# Patient Record
Sex: MALE | Race: WHITE
[De-identification: names, ages, dates, MRNs, and addresses within clinical notes are randomized per-mention and may not be internally consistent; named-entity substitution may affect disease eponyms.]

---

## 2022-03-27 ENCOUNTER — HOSPITAL ENCOUNTER (EMERGENCY)
Dept: HOSPITAL 15 - ER | Age: 37
Discharge: LEFT BEFORE DISCHARGE | End: 2022-03-27
Payer: COMMERCIAL

## 2022-03-27 VITALS — WEIGHT: 250 LBS | BODY MASS INDEX: 33.13 KG/M2 | HEIGHT: 73 IN

## 2022-03-27 VITALS — SYSTOLIC BLOOD PRESSURE: 140 MMHG | DIASTOLIC BLOOD PRESSURE: 82 MMHG

## 2022-03-27 DIAGNOSIS — M79.601: Primary | ICD-10-CM

## 2022-03-27 DIAGNOSIS — Z53.21: ICD-10-CM

## 2022-04-11 ENCOUNTER — HOSPITAL ENCOUNTER (EMERGENCY)
Dept: HOSPITAL 15 - ER | Age: 37
Discharge: LEFT BEFORE DISCHARGE | End: 2022-04-11
Payer: COMMERCIAL

## 2022-04-11 VITALS
WEIGHT: 215 LBS | HEIGHT: 72 IN | DIASTOLIC BLOOD PRESSURE: 88 MMHG | BODY MASS INDEX: 29.12 KG/M2 | SYSTOLIC BLOOD PRESSURE: 130 MMHG

## 2022-04-11 DIAGNOSIS — Z53.29: ICD-10-CM

## 2022-04-11 DIAGNOSIS — R07.89: Primary | ICD-10-CM

## 2022-04-11 LAB
ALBUMIN SERPL-MCNC: 4.6 G/DL (ref 3.4–5)
ALP SERPL-CCNC: 47 U/L (ref 45–117)
ALT SERPL-CCNC: 43 U/L (ref 16–61)
ANION GAP SERPL CALCULATED.3IONS-SCNC: 3 MMOL/L (ref 5–15)
BILIRUB SERPL-MCNC: 0.5 MG/DL (ref 0.2–1)
BUN SERPL-MCNC: 10 MG/DL (ref 7–18)
BUN/CREAT SERPL: 8.6
CALCIUM SERPL-MCNC: 9.8 MG/DL (ref 8.5–10.1)
CHLORIDE SERPL-SCNC: 107 MMOL/L (ref 98–107)
CO2 SERPL-SCNC: 30 MMOL/L (ref 21–32)
GLUCOSE SERPL-MCNC: 98 MG/DL (ref 74–106)
HCT VFR BLD AUTO: 48.3 % (ref 41–53)
HGB BLD-MCNC: 16.9 G/DL (ref 13.5–17.5)
MCH RBC QN AUTO: 31.7 PG (ref 28–32)
MCV RBC AUTO: 90.5 FL (ref 80–100)
NRBC BLD QL AUTO: 0.2 %
POTASSIUM SERPL-SCNC: 4.1 MMOL/L (ref 3.5–5.1)
PROT SERPL-MCNC: 8 G/DL (ref 6.4–8.2)
SODIUM SERPL-SCNC: 140 MMOL/L (ref 136–145)

## 2022-04-11 PROCEDURE — 36415 COLL VENOUS BLD VENIPUNCTURE: CPT

## 2022-04-11 PROCEDURE — 85025 COMPLETE CBC W/AUTO DIFF WBC: CPT

## 2022-04-11 PROCEDURE — 83880 ASSAY OF NATRIURETIC PEPTIDE: CPT

## 2022-04-11 PROCEDURE — 71045 X-RAY EXAM CHEST 1 VIEW: CPT

## 2022-04-11 PROCEDURE — 80053 COMPREHEN METABOLIC PANEL: CPT

## 2022-04-11 PROCEDURE — 81001 URINALYSIS AUTO W/SCOPE: CPT

## 2022-04-11 PROCEDURE — 93005 ELECTROCARDIOGRAM TRACING: CPT

## 2022-04-11 PROCEDURE — 84484 ASSAY OF TROPONIN QUANT: CPT

## 2022-08-31 ENCOUNTER — HOSPITAL ENCOUNTER (EMERGENCY)
Dept: HOSPITAL 15 - ER | Age: 37
Discharge: LEFT BEFORE DISCHARGE | End: 2022-08-31
Payer: COMMERCIAL

## 2022-08-31 VITALS — HEIGHT: 73 IN | BODY MASS INDEX: 31.26 KG/M2 | WEIGHT: 235.89 LBS

## 2022-08-31 VITALS — DIASTOLIC BLOOD PRESSURE: 76 MMHG | SYSTOLIC BLOOD PRESSURE: 127 MMHG

## 2022-08-31 DIAGNOSIS — Z53.21: ICD-10-CM

## 2022-08-31 DIAGNOSIS — R07.89: Primary | ICD-10-CM

## 2022-08-31 DIAGNOSIS — R06.02: ICD-10-CM

## 2022-08-31 PROCEDURE — 93005 ELECTROCARDIOGRAM TRACING: CPT

## 2022-09-24 ENCOUNTER — HOSPITAL ENCOUNTER (EMERGENCY)
Dept: HOSPITAL 15 - ER | Age: 37
Discharge: HOME | End: 2022-09-24
Payer: COMMERCIAL

## 2022-09-24 VITALS — WEIGHT: 240.52 LBS | HEIGHT: 73 IN | BODY MASS INDEX: 31.88 KG/M2

## 2022-09-24 VITALS — DIASTOLIC BLOOD PRESSURE: 84 MMHG | SYSTOLIC BLOOD PRESSURE: 123 MMHG

## 2022-09-24 DIAGNOSIS — F17.210: ICD-10-CM

## 2022-09-24 DIAGNOSIS — K21.9: Primary | ICD-10-CM

## 2022-09-24 LAB
ALBUMIN SERPL-MCNC: 4.3 G/DL (ref 3.4–5)
ALP SERPL-CCNC: 43 U/L (ref 45–117)
ALT SERPL-CCNC: 40 U/L (ref 16–61)
ANION GAP SERPL CALCULATED.3IONS-SCNC: 6 MMOL/L (ref 5–15)
BILIRUB SERPL-MCNC: 0.6 MG/DL (ref 0.2–1)
BUN SERPL-MCNC: 14 MG/DL (ref 7–18)
BUN/CREAT SERPL: 12.6
CALCIUM SERPL-MCNC: 9.2 MG/DL (ref 8.5–10.1)
CHLORIDE SERPL-SCNC: 106 MMOL/L (ref 98–107)
CO2 SERPL-SCNC: 28 MMOL/L (ref 21–32)
GLUCOSE SERPL-MCNC: 97 MG/DL (ref 74–106)
HCT VFR BLD AUTO: 47.1 % (ref 41–53)
HGB BLD-MCNC: 15.9 G/DL (ref 13.5–17.5)
MCH RBC QN AUTO: 31 PG (ref 28–32)
MCV RBC AUTO: 91.4 FL (ref 80–100)
NRBC BLD QL AUTO: 0.1 %
POTASSIUM SERPL-SCNC: 4 MMOL/L (ref 3.5–5.1)
PROT SERPL-MCNC: 7.3 G/DL (ref 6.4–8.2)
SODIUM SERPL-SCNC: 140 MMOL/L (ref 136–145)

## 2022-09-24 PROCEDURE — 71046 X-RAY EXAM CHEST 2 VIEWS: CPT

## 2022-09-24 PROCEDURE — 80053 COMPREHEN METABOLIC PANEL: CPT

## 2022-09-24 PROCEDURE — 99285 EMERGENCY DEPT VISIT HI MDM: CPT

## 2022-09-24 PROCEDURE — 84484 ASSAY OF TROPONIN QUANT: CPT

## 2022-09-24 PROCEDURE — 93005 ELECTROCARDIOGRAM TRACING: CPT

## 2022-09-24 PROCEDURE — 36415 COLL VENOUS BLD VENIPUNCTURE: CPT

## 2022-09-24 PROCEDURE — 85025 COMPLETE CBC W/AUTO DIFF WBC: CPT

## 2022-09-28 ENCOUNTER — HOSPITAL ENCOUNTER (EMERGENCY)
Dept: HOSPITAL 15 - ER | Age: 37
LOS: 1 days | Discharge: LEFT BEFORE DISCHARGE | End: 2022-09-29
Payer: MEDICAID

## 2022-09-28 VITALS — SYSTOLIC BLOOD PRESSURE: 131 MMHG | DIASTOLIC BLOOD PRESSURE: 85 MMHG

## 2022-09-28 VITALS — HEIGHT: 73 IN | WEIGHT: 231.49 LBS | BODY MASS INDEX: 30.68 KG/M2

## 2022-09-28 DIAGNOSIS — F41.9: ICD-10-CM

## 2022-09-28 DIAGNOSIS — R07.89: Primary | ICD-10-CM

## 2022-09-28 DIAGNOSIS — Z53.29: ICD-10-CM

## 2022-09-28 LAB
ALBUMIN SERPL-MCNC: 4.5 G/DL (ref 3.4–5)
ALP SERPL-CCNC: 44 U/L (ref 45–117)
ALT SERPL-CCNC: 38 U/L (ref 16–61)
ANION GAP SERPL CALCULATED.3IONS-SCNC: 7 MMOL/L (ref 5–15)
BILIRUB SERPL-MCNC: 0.7 MG/DL (ref 0.2–1)
BUN SERPL-MCNC: 16 MG/DL (ref 7–18)
BUN/CREAT SERPL: 13.3
CALCIUM SERPL-MCNC: 9.5 MG/DL (ref 8.5–10.1)
CHLORIDE SERPL-SCNC: 105 MMOL/L (ref 98–107)
CO2 SERPL-SCNC: 29 MMOL/L (ref 21–32)
GLUCOSE SERPL-MCNC: 92 MG/DL (ref 74–106)
HCT VFR BLD AUTO: 46 % (ref 41–53)
HGB BLD-MCNC: 15.9 G/DL (ref 13.5–17.5)
MCH RBC QN AUTO: 30.9 PG (ref 28–32)
MCV RBC AUTO: 89.3 FL (ref 80–100)
NRBC BLD QL AUTO: 0.1 %
POTASSIUM SERPL-SCNC: 4.1 MMOL/L (ref 3.5–5.1)
PROT SERPL-MCNC: 7.6 G/DL (ref 6.4–8.2)
SODIUM SERPL-SCNC: 141 MMOL/L (ref 136–145)

## 2022-09-28 PROCEDURE — 36415 COLL VENOUS BLD VENIPUNCTURE: CPT

## 2022-09-28 PROCEDURE — 80053 COMPREHEN METABOLIC PANEL: CPT

## 2022-09-28 PROCEDURE — 84484 ASSAY OF TROPONIN QUANT: CPT

## 2022-09-28 PROCEDURE — 71045 X-RAY EXAM CHEST 1 VIEW: CPT

## 2022-09-28 PROCEDURE — 85025 COMPLETE CBC W/AUTO DIFF WBC: CPT

## 2022-09-28 PROCEDURE — 93005 ELECTROCARDIOGRAM TRACING: CPT

## 2022-10-05 ENCOUNTER — HOSPITAL ENCOUNTER (EMERGENCY)
Dept: HOSPITAL 15 - ER | Age: 37
Discharge: LEFT BEFORE DISCHARGE | End: 2022-10-05
Payer: COMMERCIAL

## 2022-10-05 VITALS — WEIGHT: 229.28 LBS | HEIGHT: 73 IN | BODY MASS INDEX: 30.39 KG/M2

## 2022-10-05 VITALS — SYSTOLIC BLOOD PRESSURE: 132 MMHG | DIASTOLIC BLOOD PRESSURE: 90 MMHG

## 2022-10-05 DIAGNOSIS — R20.0: ICD-10-CM

## 2022-10-05 DIAGNOSIS — Z53.21: ICD-10-CM

## 2022-10-05 DIAGNOSIS — R07.89: Primary | ICD-10-CM

## 2022-10-05 LAB
ALBUMIN SERPL-MCNC: 4.3 G/DL (ref 3.4–5)
ALP SERPL-CCNC: 47 U/L (ref 45–117)
ALT SERPL-CCNC: 41 U/L (ref 16–61)
ANION GAP SERPL CALCULATED.3IONS-SCNC: 7 MMOL/L (ref 5–15)
APTT PPP: 27 SEC (ref 24.6–33.4)
BILIRUB SERPL-MCNC: 0.7 MG/DL (ref 0.2–1)
BUN SERPL-MCNC: 14 MG/DL (ref 7–18)
BUN/CREAT SERPL: 11.3
CALCIUM SERPL-MCNC: 9.1 MG/DL (ref 8.5–10.1)
CHLORIDE SERPL-SCNC: 106 MMOL/L (ref 98–107)
CO2 SERPL-SCNC: 29 MMOL/L (ref 21–32)
GLUCOSE SERPL-MCNC: 103 MG/DL (ref 74–106)
HCT VFR BLD AUTO: 46.5 % (ref 41–53)
HGB BLD-MCNC: 15.8 G/DL (ref 13.5–17.5)
INR PPP: 0.94 (ref 0.9–1.15)
MAGNESIUM SERPL-MCNC: 2.2 MG/DL (ref 1.6–2.6)
MCH RBC QN AUTO: 30.9 PG (ref 28–32)
MCV RBC AUTO: 91 FL (ref 80–100)
NRBC BLD QL AUTO: 0.4 %
POTASSIUM SERPL-SCNC: 3.7 MMOL/L (ref 3.5–5.1)
PROT SERPL-MCNC: 7.4 G/DL (ref 6.4–8.2)
SODIUM SERPL-SCNC: 142 MMOL/L (ref 136–145)

## 2022-10-05 PROCEDURE — 93005 ELECTROCARDIOGRAM TRACING: CPT

## 2022-10-05 PROCEDURE — 84484 ASSAY OF TROPONIN QUANT: CPT

## 2022-10-05 PROCEDURE — 84443 ASSAY THYROID STIM HORMONE: CPT

## 2022-10-05 PROCEDURE — 71045 X-RAY EXAM CHEST 1 VIEW: CPT

## 2022-10-05 PROCEDURE — 85025 COMPLETE CBC W/AUTO DIFF WBC: CPT

## 2022-10-05 PROCEDURE — 85379 FIBRIN DEGRADATION QUANT: CPT

## 2022-10-05 PROCEDURE — 85610 PROTHROMBIN TIME: CPT

## 2022-10-05 PROCEDURE — 85730 THROMBOPLASTIN TIME PARTIAL: CPT

## 2022-10-05 PROCEDURE — 80053 COMPREHEN METABOLIC PANEL: CPT

## 2022-10-05 PROCEDURE — 83880 ASSAY OF NATRIURETIC PEPTIDE: CPT

## 2022-10-05 PROCEDURE — 36415 COLL VENOUS BLD VENIPUNCTURE: CPT

## 2022-10-05 PROCEDURE — 83735 ASSAY OF MAGNESIUM: CPT

## 2025-01-14 NOTE — DVH
EXAM: XY CHEST PORTABLE



CLINICAL HISTORY: CP



TECHNIQUE: Single AP view of the chest



WID: 



COMPARISON: CXRP on DOS: 10/5/22



FINDINGS: 



Lines and tubes: None



Chest: 



The heart size and pulmonary vasculature is within normal limits.



No pleural effusion, pneumothorax, or consolidation.



The osseous structures are grossly intact.



IMPRESSION: 



No acute cardiopulmonary abnormality.



Electronically Signed by: Lizzy Thompson at 01/14/2025 17:58:28 PM

## 2025-01-14 NOTE — ECG
[facility]

                                       

Test Date:    2025               Test Time:    18:28:57

Pat Name:     MAYUR MARSH        Department:   ER

Patient ID:   [id number]           Room:          

Gender:       M                        Technician:   ANNA

:          1985               Requested By: MEREDITH SCHREIBER

Order Number: [id number]        Reading MD:    

                                 Measurements

Intervals                              Axis          

Rate:         67                       P:            25

AL:           146                      QRS:          82

QRSD:         90                       T:            55

QT:           367                                    

QTc:          388                                    

                           Interpretive Statements

Sinus rhythm



Please click the below link to view image of tracing.

## 2025-01-14 NOTE — ED.PDOC
History of Present Illness


HPI Comments


38 y/o M, with a history of anxiety, panic attacks, and former tobacco 

cigarettes and nicotine vape use, presents with c/o of chest pain, shortness of 

breath, dizziness, and fatigue for 1.5x days, today. Patient endorses on sudden 

and unprovoked onset of symptoms, intermittently, since initial onset, with no 

prior history of, that lasts a "few minutes" in duration "every 2 hours." He 

comments on pain being "heavy" in quality that feels like "[his] heart is 

slowing down and pumping 'syrup'." Patient reports history of "irregular heart 

rhythm" that he was diagnosed when following up with a cardiologist after having

 both stress tests and Halter monitor performed in the past but comments on 

current symptoms feeling different. He reports no additional relevant or 

pertinent history, such as recent travel, sick contact, or substance use. 

Patient denies having any palpitations, cough, nausea, vomiting, fever, chills, 

or other associated symptoms or modifiers at this time.


Chief Complaint:  Chest Pain


Time Seen by MD:  16:15


Primary Care Provider:  BETTY


Reviewed Notes:  Nurses Notes, Medications, Allergies


Allergies:  


Coded Allergies:  


     NO KNOWN ALLERGIES (Unverified , 22)


Home Meds


Active Scripts


Omeprazole Magnesium (Omeprazole) 20 Mg Tab, 20 MG PO DAILY for 14 Days, #14 TAB


   Prov:YOVANY GAMBLE MD         22


Information Source:  Patient


Mode of Arrival:  Ambulatory


Severity:  Moderate


Timing:  Days


Duration:  Intermittent


Prehospital treatment:  None


Review of Systems:


REVIEW OF SYSTEMS: Fatigue, no fever, no chills


HEENT: No sore throat, no earache, no congestion,  no neck pain. 


Cardiac: Chest pain. No palpitations. 


Lungs: Shortness of breath, no cough. 


GI: No nausea, no vomiting, no diarrhea, no constipation, no abdominal pain


: No dysuria, frequency, or urgency. No hematuria. 


Musculoskeletal: No joint pain , no joint swelling, no extremity edema. 


Skin: No rash, no itching. 


Neuro:  Dizziness, no headache, no weakness


Vital Signs





                                   Vital Signs








  Date Time  Temp Pulse Resp B/P (MAP) Pulse Ox O2 Delivery O2 Flow Rate FiO2


 


25 19:06  67      


 


25 17:40 99.0  18 113/73 (86) 97   








Physical Exam


General: Awake, alert and oriented. No acute distress. 





Skin: Skin in warm, dry and intact. Appropriate color for ethnicity. Nailbeds 

pink with no cyanosis.





HEENT: The head is normocephalic and atraumatic. Conjunctivae are clear without 

exudates or hemorrhage. Sclera is non-icteric. EOM are intact. No signs of 

nystagmus. Eyelids are normal in appearance without swelling or lesions. Oral 

mucosa is pink and moist





Neck: The neck is supple with normal range of motion. No JVD.





Cardiac:  Heart rate and rhythm are normal. No murmurs, gallops, or rubs are 

auscultated. 





Respiratory: No signs of respiratory distress. Lung sounds are clear in all 

lobes bilaterally without rales, ronchi, or wheezes.





Abdominal: Abdomen is soft, non-tender without distention. Bowel sounds are 

present and normoactive in all four quadrants. 





Extremities: Upper and lower extremities are atraumatic in appearance without 

deformity or edema.





Neurological: The patient is awake, alert and oriented to person, place, and 

time with normal speech. Speech is clear. There is no facial asymmetry. 





Psychiatric: Appropriate mood and affect. Good judgement and insight. No visual 

or auditory hallucinations.





Past Medical History


PAST MEDICAL HISTORY:  Anxiety


Past Medical History (Other):  


panic attacks


Surgical History:  Denies all surgeries





Family History


Family History:  Reviewed,noncontributory to illness





Social History


Smoker:  Non-Smoker (former tobacco cigarettes and nicotine vape use )


Alcohol:  Sober


Drugs:  Denies Drug Use


Lives In:  Home





Was a procedure done?


Was a procedure done?:  No





EKG


EKG :  


   Pulse Rate (adult):  67


   Axis:  Normal


   Cardiac Rhythm:  NSR


   Block:  None


   Hypertrophy:  None


   ST:  Normal





Differential Dx


Considerations may include:


MI, PE, arrhythmia, ACS,  anxiety, panic attacks, angina, costochondritis, 

pericarditis, PNA, viral syndrome, URI





X-Ray, Labs, Meds, VS





                                   Vital Signs








  Date Time  Temp Pulse Resp B/P (MAP) Pulse Ox O2 Delivery O2 Flow Rate FiO2


 


25 19:06  67      


 


25 18:28  67      


 


25 17:40 99.0 81 18 113/73 (86) 97   


 


25 17:36  74      








                                       Lab








Test


 25


18:51 25


17:47 25


17:45 Range/Units


 


 


Troponin I High Sensitivity Pending  4   </=54  ng/L


 


White Blood Count


 


 5.4 


 


 4.4-10.8


10^3/uL


 


Red Blood Count


 


 4.97 


 


 4.5-5.90


10^6/uL


 


Hemoglobin  15.9   13.5-17.5  g/dL


 


Hematocrit  45.6   41.0-53.0  %


 


Mean Corpuscular Volume  91.7   80.0-100.0  fL


 


Mean Corpuscular Hemoglobin  32.0   28.0-32.0  pg


 


Mean Corpuscular Hemoglobin


Concent 


 34.9 


 


 32.0-36.0  g/dL





 


Red Cell Distribution Width  13.4   11.8-14.3  %


 


Platelet Count


 


 187 


 


 140-450


10^3/uL


 


Mean Platelet Volume  7.7   6.9-10.8  fL


 


Neutrophils (%) (Auto)  63.3   37.0-80.0  %


 


Lymphocytes (%) (Auto)  25.4   10.0-50.0  %


 


Monocytes (%) (Auto)  7.4   0.0-12.0  %


 


Eosinophils (%) (Auto)  3.4   0.0-7.0  %


 


Basophils (%) (Auto)  0.5   0.0-2.0  %


 


Neutrophils # (Auto)


 


 3.4 


 


 1.6-8.6  10


^3/uL


 


Lymphocytes # (Auto)


 


 1.4 


 


 0.4-5.4  10


^3/uL


 


Monocytes # (Auto)  0.4   0-1.3  10 ^3/uL


 


Eosinophils # (Auto)  0.2   0-0.8  10 ^3/uL


 


Basophils # (Auto)  0   0-0.2  10 ^3/uL


 


Nucleated Red Blood Cells  0.2    %


 


Sodium Level  140   136-145  mmol/L


 


Potassium Level  3.6   3.5-5.1  mmol/L


 


Chloride Level  105     mmol/L


 


Carbon Dioxide Level  30   20-31  mmol/L


 


Anion Gap  5   5-15  


 


Blood Urea Nitrogen  14   9-23  mg/dL


 


Creatinine


 


 1.13 


 


 0.700-1.30


mg/dL


 


Glomerular Filtration Rate


Calc 


 84 


 


 >90  mL/min





 


BUN/Creatinine Ratio  12.4   10.0-20.0  


 


Serum Glucose  89     mg/dL


 


Calcium Level  10.6 H  8.7-10.4  mg/dL


 


Total Bilirubin  0.7   0.2-1.0  mg/dL


 


Aspartate Amino Transferase


(AST) 


 28 


 


 13-40  U/L





 


Alanine Aminotransferase (ALT)  39   7-40  U/L


 


Alkaline Phosphatase  45 L    U/L


 


Total Protein  7.6   5.7-8.2  g/dL


 


Albumin  5.0 H  3.2-4.8  g/dL


 


Urine Color   Light-yellow  Yellow  


 


Urine Clarity   Clear  Clear  


 


Urine pH   7.0  5.0-9.0  


 


Urine Specific Gravity   1.017  1.001-1.035  


 


Urine Protein   Negative  Negative  


 


Urine Ketones   Negative  Negative  


 


Urine Blood   Negative  Negative  /uL


 


Urine Nitrite   Negative  Negative  


 


Urine Bilirubin   Negative  Negative  


 


Urine Urobilinogen   Normal  Negative  mg/dL


 


Urine Leukocyte Esterase   Negative  Negative  /uL


 


Urine RBC   1  0 - 3  /hpf


 


Urine WBC   None seen  0 - 3  /hpf


 


Urine Squamous Epithelial


Cells 


 


 None seen 


 <5  /hpf





 


Urine Bacteria   None seen  None Seen  /hpf


 


Urine Glucose   Normal  Normal  mg/dL





[place] [id number]


Ph: [phone]





DIAGNOSTIC IMAGING


Diagnostic Imaging Report : [id number]


Signed








PATIENT: MAYUR MARSH   ACCT: [id number]   UNIT: [id number]


: 1985   LOC: ER   ROOM / BED:  / 


AGE / SEX: 39 / M   ADM STATUS: REG ER   SERVICE DT: 25





ORDERING PHYSICIAN: MEREDITH SCHREIBER


PROCEDURE(s): CXRP - CHEST PORTABLE


REASON: CP


ORDER NUMBER(s): [id number], ACCESSION NUMBER(s): [id number]








EXAM: XY CHEST PORTABLE





CLINICAL HISTORY: CP





TECHNIQUE: Single AP view of the chest





WID: 





COMPARISON: CXRP on DOS: 10/5/22





FINDINGS: 





Lines and tubes: None





Chest: 





The heart size and pulmonary vasculature is within normal limits.





No pleural effusion, pneumothorax, or consolidation.





The osseous structures are grossly intact.





IMPRESSION: 





No acute cardiopulmonary abnormality.





Electronically Signed by: Lizzy Henry at 2025 17:58:28 PM











DICTATED BY: OZZIE HENRY MD


DICTATED DATE/TIME: 25





SIGNED BY: OZZIE HENRY MD


SIGNED DATE/TIME: 25





CC:


Time of 1ST Reevaluation:  16:45


Reevaluation 1ST:  Unchanged


Patient Education/Counseling:  Diagnosis, Treatment


Family Education/Counseling:  No Family Present





Departure 1


Departure


Time of Disposition:  19:45


Impression:  


   Primary Impression:  


   Chest pain


Disposition:  01 HOME / SELF CARE / HOMELESS


Condition:  Stable





Additional Instructions:  


ED DISCHARGE INSTRUCTIONS


 


Instructions: 


Please read all instructions provided in this packet carefully. 





Although you have been discharged from the Emergency Department, this does not 

mean that you have a "clean bill of health". No definitive diagnosis for your 

symptoms has been made today. It is possible that you are in the process of 

developing a serious illness. This is why you must return to the ED without fail

 if any new or worsening symptoms (especially if your symptoms include chest 

pain, trouble breathing, abdominal pain, fever, headache, confusion, trouble 

seeing, or trouble walking)





It is also very important that you see a primary care doctor within the next 3-5

 days to follow up.





Schedule an appointment with your cardiologist for further evaluation of your 

symptoms within the next week as well.





If you are unable to get an appointment, return to the ED for re-evaluation.











CHEST PAIN EDUCATION





There are many things that can cause chest pain. Some are not serious and will 

get better on their own in a few days. But some kinds of chest pain need more 

testing and treatment. Your doctor may have recommended a follow-up visit in the

 next few days. If you are not getting better, you may need more tests or 

treatment.





Even though your doctor has released you, you still need to watch for any 

problems. The doctor carefully checked you, but sometimes problems can develop 

later. If you have new symptoms or if your symptoms do not get better, get 

medical care right away.





If you have worse or different chest pain or pressure that lasts more than 5 

minutes or you passed out (lost consciousness), call 911 or seek other emergency

 help right away.





A medical visit is only one step in your treatment. Even if you feel better, you

 still need to do what your doctor recommends, such as going to all suggested 

follow-up appointments and taking medicines exactly as directed. This will help 

you recover and help prevent future problems.





How can you care for yourself at home?


Rest until you feel better.


Take your medicine exactly as prescribed. Call your doctor if you think you are 

having a problem with your medicine.


Do not drive after taking a prescription pain medicine.


When should you call for help?


 


Call 911 if:





You passed out (lost consciousness).


You have severe difficulty breathing.


You have symptoms of a heart attack. These may include:


Chest pain or pressure, or a strange feeling in your chest.


Sweating.


Shortness of breath.


Nausea or vomiting.


Pain, pressure, or a strange feeling in your back, neck, jaw, or upper belly or 

in one or both shoulders or arms.


Lightheadedness or sudden weakness.


A fast or irregular heartbeat.


After you call 911, the  may tell you to chew 1 adult-strength or 2 to 4

 low-dose aspirin. Wait for an ambulance. Do not try to drive yourself.


Call your doctor now or seek immediate medical care if:





You have any trouble breathing.


You have new or different chest pain.


You are dizzy or lightheaded, or you feel like you may faint.


Watch closely for changes in your health, and be sure to contact your doctor if 

you do not get better as expected.





Current as of: 2024





Author: [employer] Staff?


Comments







































































---------------------------------------------------------


Extensive evaluation was performed in attempt to identify or rule out:  (See 

differential diagnosis section)





The following tests were ordered, and results were reviewed by me:  (See diag

nostic results section)





The following test were independently interpreted by me: N/A





I reviewed and agreed with the following test results read by other providers: 

N/A





I reviewed the following notes from the pt's past medical encounters: (None 

available at this time)





Additional information was gathered from interviewing the following independent 

historians: N/A





Discussion of management or test interpretation with external physician/other 

qualified health care professional: N/A





Addressed [ ]one or more chronic illnesses with severe exacerbation, 

progression, or side effects of treatment:  [ ]an acute or chronic illness that 

poses a threat to life or bodily function: [ ]





Decision regarding hospitalization or escalation of hospital level of care: Risk

and benefits of admission for further treatment of patient's condition was 

considered. Due to patient's current clinical  condition, high risk of decline 

and poor outcome if discharged and need for further inpatient management and 

monitoring, patient will be admitted to the hospital. 





Drug therapy requiring intensive monitoring for toxicity: N/A 


Parenteral controlled substances: N/A


Decision regarding elective major surgery with identified patient or procedure 

risk factors: N/A


Decision regarding emergency major surgery: N/A


Decision not to resuscitate or to de-escalate care because of poor prognosis: 

N/A


Diagnosis or treatment significantly limited by social determinants of health: 

N/A





Decision regarding hospitalization or escalation of hospital level of care:  

Risks and benefits of admission for further treatment of patient's condition was

considered however due to patient's stable condition patient will be discharged 

to follow up closely or return to care for worsening of condition or inability 

to follow up.





Critical Care Note


Critical Care Time?:  No





Stability


Stability form required:  No





Heart Score


Heart Score:  








Heart Score Response (Comments) Value


 


History Slightly Suspicious 0


 


EKG Normal 0


 


Age <45 0


 


Risk Factors                            1 or 2 risk factors 1


 


Total  1














I personally scribed for JOSIE COELHO MD ([email or web address]) on 25 at 19:06.  

Electronically submitted by Ihsan Villarreal ([email or web address]).





JOSIE COELHO MD            2025 19:06

## 2025-03-06 NOTE — DVH
EXAM: XY CHEST XRAY 1 VIEW



Indication: cp



Technique: Single frontal view of the chest was obtained



Comparison: XY CHEST PORTABLE on DOS: 1/14/25, CXRP on DOS: 10/5/22, CHEST PORTABLE on DOS: 9/28/22, 
CHEST PORTABLE on DOS: 4/11/22, CXRP on DOS: 4/11/22



FINDINGS: 



Lines and Tubes: None



Lungs: No focal consolidation.



Pleura: No effusion.



No pneumothorax. 



Cardiomediastinal contours: Unremarkable



Bones: No acute osseous abnormality.



IMPRESSION:



No acute cardiopulmonary disease.



Electronically Signed by: Shelbie Soria at 03/06/2025 10:13:16 AM

## 2025-03-06 NOTE — ECG
[facility]

                                       

Test Date:    2025               Test Time:    08:03:27

Pat Name:     MAYUR MARSH        Department:   C

Patient ID:   [id number]           Room:         0284T

Gender:       M                        Technician:   RODNEY

:          1985               Requested By: MARIELA LUNA

Order Number: [id number]        Reading MD:   Oscar Villarreal

                                 Measurements

Intervals                              Axis          

Rate:         86                       P:            42

SC:           160                      QRS:          74

QRSD:         86                       T:            54

QT:           359                                    

QTc:          430                                    

                           Interpretive Statements

Sinus rhythm

Ventricular premature complex

Electronically Signed On 3-8-2025 17:58:19 PST by Oscar Villarreal



Please click the below link to view image of tracing.

## 2025-03-06 NOTE — DVHHP2
History of Present Illness


Reason for Visit:  Chest pain


History of Present Illness


Shai Dickey is a 39-year-old male with past medical history of 

hypertension and anxiety who presents to the ED with chest pain and tightness 

nonradiating that started around 7:00 a.m. while he was at work.  Patient 

reports that he is a  was walking around gathering items together 

when the pain suddenly came about he states that the pain is 3/10 tight and 

intermittent.  He also endorses shortness of breath and bilateral upper 

extremity and lower extremity numbness and tingling.  Patient states that he has

a usual stressors in his life but nothing out of the ordinary or new.  Patient 

denies any recent trauma or injury, recent illnesses, abdominal pain, nausea, 

vomiting, fever, chills, wheezing, lightheadedness, dizziness, and weakness.





Patient also reports that at the age of 29 years old he was in rehab for alcohol

abuse and around 9 years ago he had stopped rehab.  Patient states that if he 

does not have to take medications he will not take anything.  He also states 

that he does not take any home medications.


Cardiovascular:  HTN


Psych:  Anxiety


Past Surgical History:  None


Family History:  Cancer, Other (Dad with colon cancer)


Smoke:  Quit


ALCOHOL:  none (Quit)


Drugs:  None (Quit marijuana)


Lives:  Alone


Domestic Violence:  Neg





Review of Systems


Respiratory:  Shortness of breath


Cardiovascular:  Chest Pain


Musculoskeletal:  other (Numbness and tingling bilateral upper and lower extrem

ities)


Allergies:  


Coded Allergies:  


     NO KNOWN ALLERGIES (Unverified , 4/11/22)





Exam


Vital Signs





Vital Signs








  Date Time  Temp Pulse Resp B/P (MAP) Pulse Ox O2 Delivery O2 Flow Rate FiO2


 


3/6/25 09:32    131/85    


 


3/6/25 09:29 97.9 66 17  97   





 97.9       


 


3/6/25 08:51      Room Air* 0 21








General Appearance:  Alert, Oriented X3, Cooperative, No acute distress


HEENT:  Atraumatic, PERRLA, EOMI, Mucous membr. moist/pink


Respiratory:  Clear to auscultation, Normal air movement


Cardiovascular:  Regular rate, Normal S1, Normal S2, No murmurs


Abdominal:  Normal bowel sounds, Soft, No tenderness, No hepatospenomegaly, No 

masses


Extremities:  No clubbing, No cyanosis, No edema, Normal pulses, No 

tenderness/swelling


Skin:  No significant lesion


Neuro:  Normal speech, Strength at 5/5 X4 ext, Normal tone, Sensation intact


Psych/Mental Status:  Mental status NL, Other (Anxious)





Labs/Xrays





                                      Labs








Test


 3/6/25


09:19 3/6/25


08:19 Range/Units


 


 


Troponin I High Sensitivity 3 L  </=54  ng/L


 


White Blood Count


 


 6.6 


 4.4-10.8


10^3/uL


 


Red Blood Count


 


 5.10 


 4.5-5.90


10^6/uL


 


Hemoglobin  16.3  13.5-17.5  g/dL


 


Hematocrit  46.4  41.0-53.0  %


 


Mean Corpuscular Volume  90.9  80.0-100.0  fL


 


Mean Corpuscular Hemoglobin  32.0  28.0-32.0  pg


 


Mean Corpuscular Hemoglobin


Concent 


 35.2 


 32.0-36.0  g/dL





 


Red Cell Distribution Width  13.3  11.8-14.3  %


 


Platelet Count


 


 182 


 140-450


10^3/uL


 


Mean Platelet Volume  7.7  6.9-10.8  fL


 


Neutrophils (%) (Auto)  78.0  37.0-80.0  %


 


Lymphocytes (%) (Auto)  15.0  10.0-50.0  %


 


Monocytes (%) (Auto)  4.7  0.0-12.0  %


 


Eosinophils (%) (Auto)  1.9  0.0-7.0  %


 


Basophils (%) (Auto)  0.4  0.0-2.0  %


 


Neutrophils # (Auto)


 


 5.2 


 1.6-8.6  10


^3/uL


 


Lymphocytes # (Auto)


 


 1.0 


 0.4-5.4  10


^3/uL


 


Monocytes # (Auto)  0.3  0-1.3  10 ^3/uL


 


Eosinophils # (Auto)  0.1  0-0.8  10 ^3/uL


 


Basophils # (Auto)  0  0-0.2  10 ^3/uL


 


Nucleated Red Blood Cells  0.0   %


 


Sodium Level  141  136-145  mmol/L


 


Potassium Level  4.2  3.5-5.1  mmol/L


 


Chloride Level  108 H   mmol/L


 


Carbon Dioxide Level  25  20-31  mmol/L


 


Anion Gap  8  5-15  


 


Blood Urea Nitrogen  18  9-23  mg/dL


 


Creatinine


 


 1.09 


 0.700-1.30


mg/dL


 


Glomerular Filtration Rate


Calc 


 88 


 >90  mL/min





 


BUN/Creatinine Ratio  16.5  10.0-20.0  


 


Serum Glucose  104    mg/dL


 


Hemoglobin A1c  5.1  <5.7  % A1C


 


Calcium Level  10.5 H 8.7-10.4  mg/dL


 


Triglycerides Level  78  < 150  mg/dL


 


Cholesterol Level  188  < 200  mg/dL


 


LDL Cholesterol  125 H < 100  mg/dL


 


HDL Cholesterol  52  40-59  mg/dL


 


Thyroid Stimulating Hormone


(TSH) 


 0.68 


 0.55-4.78


uIU/mL








EXAM: XY CHEST XRAY 1 VIEW





Indication: cp





Technique: Single frontal view of the chest was obtained





Comparison: XY CHEST PORTABLE on DOS: 1/14/25, CXRP on DOS: 10/5/22, CHEST 

PORTABLE on DOS: 9/28/22, CHEST PORTABLE on DOS: 4/11/22, CXRP on DOS: 4/11/22





FINDINGS: 





Lines and Tubes: None





Lungs: No focal consolidation.





Pleura: No effusion.





No pneumothorax. 





Cardiomediastinal contours: Unremarkable





Bones: No acute osseous abnormality.





IMPRESSION:





No acute cardiopulmonary disease.





Assessment/Plan


Assessment/Plan


Assessment


Chest pain


Bilateral upper extremity and lower extremity numbness and tingling likely due 

to anxiety


History of anxiety


History of hypertension


History of ETOH abuse, was in rehab at 29 years of age


Ex tobacco use 


Ex marijuana use





Plan 


Admit to tele 


Nitro given in ED


Aspirin given in ED 


Patient refused Ativan in ED


UDS 


EKG


Troponin negative 


Chest x-ray noted 


TSH


Lipid panel


A1c


Echo ordered 


ACS workup 


ACS protocol


Antiemetics 


Pain management


Diet 


No home medications reconciled 


Discussed plan of care with patient and nurse


Educated patient on continuing cessation of EtOH, tobacco, and marijuana use


Plan discussed with:  Patient


My Orders





                           Orders - STEPH AGUILAR FNJASMEET








Procedure Category Date Status





  Time 


 


Chest Xray 1 View XY 3/6/25 Resulted





  09:42 


 


Admit ADMIT 3/6/25 Transmitted





  11:23 


 


Code Status CODE 3/6/25 Transmitted





  11:23 


 


Vital Signs KAY 3/6/25 Transmitted





  11:23 


 


Cardiac Monitor KAY 3/6/25 Transmitted





  11:23 


 


Cardiac DIET 3/6/25 Transmitted





Diet-2gna,Lofat,Lochol  Lunch 


 


Aspirin Tablet PHA 3/7/25 Transmitted





  10:00 


 


Lipitor 40mg Hs PHA 3/6/25 Transmitted





Hi-Intensity  22:00 


 


Acetaminophen Tablet PHA 3/6/25 Transmitted





 (Tylenol Tablet)  11:30 


 


Complete Blood Count LAB 3/7/25 Verified





  04:00 


 


Basic Metabolic Panel LAB 3/7/25 Verified





  04:00 


 


Magnesium LAB 3/7/25 Verified





  04:00 


 


Echo 2d Mode Cardiac US 3/6/25 Transmitted





DOP  11:23 


 


Electrocardigram EKG 3/7/25 Transmitted





  04:00 


 


Troponin-I Hs LAB 3/6/25 Transmitted





  11:23 


 


Cardiac KAY 3/6/25 Transmitted





Rehabilitation - Outpa   


 


Nitroglycerin PHA 3/6/25 Transmitted





Sublingual (Ntrostat  11:30 


 


Morphine Sulfate PHA 3/6/25 Transmitted





Injection  11:30 


 


Stat Ekg For Chest [facility] 3/6/25 Transmitted





Pain  11:23 


 


Notify Md Of Changes [facility] 3/6/25 Transmitted





From Base  11:23 


 


Telemetry Monitor For [facility] 3/6/25 Transmitted





24 Hours  11:23 


 


Emergency Dysrhythmia [facility] 3/6/25 Transmitted





Protocol  11:23 


 


Rhythm Strips Once [facility] 3/6/25 Transmitted





Every Shift  11:23 


 


Oxygen By Nasal RT 3/6/25 Transmitted





Cannula  11:23 











Date of Service:  Mar 6, 2025


Billing Provider:  STEPH AGUILAR


Common Visit Codes:  99223-INITIAL  INP/OBS CARE (HIGH)











STEPH AGUILAR               Mar 6, 2025 11:44

## 2025-03-06 NOTE — ED.PDOC
HPI Comments


38 y/o M, with PMHX of anxiety presents to the ED for CC of chest pain. Patient 

states, that he has been experiencing left sided non-radiating chest pain 

xhours. Patient relays, that pain came on suddenly with light exertion at work. 

Patient comments on, experiencing similar symptoms in past with associated 

shortness of breath and chest discomfort. Patient describes pain as tightness. 

Patient denies palpitations, abdominal pain, headache, numbness, or weakness. No

other associated symptoms, modifiers, recent injuries or sick contacts at this 

time.


Chief Complaint:  Chest Pain


Time Seen by MD:  08:20


Primary Care Provider:  BETTY


Reviewed Notes:  Nurses Notes, Medications, Allergies


Allergies:  


Coded Allergies:  


     NO KNOWN ALLERGIES (Unverified , 22)


Home Meds


Active Scripts


Omeprazole Magnesium (Omeprazole) 20 Mg Tab, 20 MG PO DAILY for 14 Days, #14 TAB


   Prov:YOVANY GAMBLE MD         22


Information Source:  Patient


Mode of Arrival:  Ambulatory


Severity:  Mild


Timing:  Hours


Duration:  Since onset


Prehospital treatment:  None


Location:  Chest (L)


Radiation:  No Radiation


Quality:  Tightness


Onset:  With Light Exertion


Cardiac Risk Factors:  None


PE Risk Factors:  None


History of:  Similar pain in past


Modifying Factors:  Nothing


Associated Signs and Symptoms:  SOB





Past Medical History


PAST MEDICAL HISTORY:  Anxiety


Surgical History:  Denies all surgeries





Family History


Family History:  Reviewed,noncontributory to illness





Social History


Smoker:  Non-Smoker


Alcohol:  Sober


Drugs:  Denies Drug Use


Lives In:  Home





Constitutional:  denies: chills, diaphoresis, fatigue, fever, malaise, sweats, 

weakness, others


EENTM:  denies: blurred vision, double vision, ear bleeding, ear discharge, ear 

drainage, ear pain, ear ringing, eye pain, eye redness, hearing loss, mouth 

pain, mouth swelling, nasal discharge, nose bleeding, nose congestion, nose gomez

n, photophobia, tearing, throat pain, throat swelling, voice changes, others


Respiratory:  reports: shortness of breath; denies: cough, hemoptysis, 

orthopnea, SOB at rest, SOB with excertion, stridor, wheezing, others


Cardiovascular:  reports: chest pain; denies: dizzy spells, diaphoresis, Dyspnea

on exertion, edema, irregular heart beat, left arm pain, lightheadedness, 

palpitations, PND, syncope, others


Gastrointestinal:  denies: abdomen distended, abdominal pain, blood streaked 

bowels, constipated, diarrhea, dysphagia, difficulty swallowing, hematemesis, 

melena, nausea, poor appetite, poor fluid intake, rectal bleeding, rectal pain, 

vomiting, others


Genitourinary:  denies: burning, dysuria, flank pain, frequency, hematuria, 

incontinence, penile discharge, penile sore, pain, testicle pain, testicle 

swelling, urgency, others


Neurological:  denies: dizziness, fainting, headache, left sided numbness, left 

sided weakness, numbness, paresthesia, pre-existing deficit, right sided 

numbness, right sided weakness, seizure, speech problems, tingling, tremors, 

weakness, others


Musculoskeletal:  denies: back pain, gout, joint pain, joint swelling, muscle 

pain, muscle stiffness, neck pain, others


Integumetry:  denies: bruises, change in color, change in hair/nails, dryness, 

laceration, lesions, lumps, rash, wounds, others


Allergic/Immunocompromised:  denies: Difficulty Healing, Frequent Infections, 

Hives, Itching, others


Hematologic/Lymphatic:  denies: anemia, blood clots, easy bleeding, easy 

bruising, swollen glands, others


Endocrine:  denies: excessive hunger, excessive sweating, excessive thirst, 

excessive urination, flushing, intolerance to cold, intolerance to heat, 

unexplained weight gain, unexplained weight loss, others


Psychiatric:  denies: anxiety, bipolar disorder, depression, hopeless, panic 

disorder, schizophrenia, sleepless, suicidal, others


All Other Systems:  Reviewed and Negative





Physical Exam


General Appearance:  Moderate Distress


HEENT:  Normal ENT Inspection, Pharynx Normal, TMs Normal


Neck:  Full Range of Motion, Non-Tender, Normal, Normal Inspection


Respiratory:  Chest Non-Tender, Lungs Clear, No Accessory Muscle Use, No Re

spiratory Distress, Normal Breath Sounds


Cardiovascular:  Tachycardia


Breast Exam:  Deferred


Gastrointestinal:  No Organomegaly, Non Tender, No Pulsatile Mass, Normal Bowel 

Sounds, Soft


Genitalia:  Deferred


Pelvic:  Deferred


Rectal:  Deferred


Extremities:  No calf tenderness, Normal capillary refill, Normal inspection, 

Normal range of motion, Non-tender, No pedal edema


Musculoskeletal :  


   Apperance:  Normal


Neurologic:  Alert, CNs II-XII nml as Tested, No Motor Deficits, Normal Affect, 

Normal Mood, No Sensory Deficits


Cerebellar Function:  NOT DONE


Reflexes:  NOT DONE


Skin:  Dry, Normal Color, Warm


Peripheral Pulses:  3+ Radial (R), 3+ Radial (L)


Lymphatic:  No Adenopathy





Was a procedure done?


Was a procedure done?:  No





CP Differential Dx


Differential Diagnosis:  A-fib, A-Flutter, Angina, Anxiety / Panic Attack, 

Atrial Dysrhythmia, Electrolyte Disorder


Differential Diagnosis:  HTN Essential, HTN Accelerated


Differential Diagnosis:  Angina, Chest Wall Pain, Costochondritis





X-Ray, Labs, Meds, VS





                                   Vital Signs








  Date Time  Temp Pulse Resp B/P (MAP) Pulse Ox O2 Delivery O2 Flow Rate FiO2


 


3/6/25 09:32    131/85    


 


3/6/25 09:29 97.9 66 17 131/85 (100) 97   





 97.9       


 


3/6/25 09:29  66      


 


3/6/25 08:51  78 18  98 Room Air* 0 21


 


3/6/25 08:30 98.0 78 20 134/87 (103) 98   





 98.0       


 


3/6/25 08:30    134/87    


 


3/6/25 08:11 97.7 95 18 133/96 (108) 99   


 


3/6/25 08:03  86      








                                       Lab








Test


 3/6/25


09:19 3/6/25


08:19 Range/Units


 


 


Troponin I High Sensitivity 3 L 3 L </=54  ng/L


 


White Blood Count


 


 6.6 


 4.4-10.8


10^3/uL


 


Red Blood Count


 


 5.10 


 4.5-5.90


10^6/uL


 


Hemoglobin  16.3  13.5-17.5  g/dL


 


Hematocrit  46.4  41.0-53.0  %


 


Mean Corpuscular Volume  90.9  80.0-100.0  fL


 


Mean Corpuscular Hemoglobin  32.0  28.0-32.0  pg


 


Mean Corpuscular Hemoglobin


Concent 


 35.2 


 32.0-36.0  g/dL





 


Red Cell Distribution Width  13.3  11.8-14.3  %


 


Platelet Count


 


 182 


 140-450


10^3/uL


 


Mean Platelet Volume  7.7  6.9-10.8  fL


 


Neutrophils (%) (Auto)  78.0  37.0-80.0  %


 


Lymphocytes (%) (Auto)  15.0  10.0-50.0  %


 


Monocytes (%) (Auto)  4.7  0.0-12.0  %


 


Eosinophils (%) (Auto)  1.9  0.0-7.0  %


 


Basophils (%) (Auto)  0.4  0.0-2.0  %


 


Neutrophils # (Auto)


 


 5.2 


 1.6-8.6  10


^3/uL


 


Lymphocytes # (Auto)


 


 1.0 


 0.4-5.4  10


^3/uL


 


Monocytes # (Auto)  0.3  0-1.3  10 ^3/uL


 


Eosinophils # (Auto)  0.1  0-0.8  10 ^3/uL


 


Basophils # (Auto)  0  0-0.2  10 ^3/uL


 


Nucleated Red Blood Cells  0.0   %


 


Sodium Level  141  136-145  mmol/L


 


Potassium Level  4.2  3.5-5.1  mmol/L


 


Chloride Level  108 H   mmol/L


 


Carbon Dioxide Level  25  20-31  mmol/L


 


Anion Gap  8  5-15  


 


Blood Urea Nitrogen  18  9-23  mg/dL


 


Creatinine


 


 1.09 


 0.700-1.30


mg/dL


 


Glomerular Filtration Rate


Calc 


 88 


 >90  mL/min





 


BUN/Creatinine Ratio  16.5  10.0-20.0  


 


Serum Glucose  104    mg/dL


 


Hemoglobin A1c  Pending   


 


Calcium Level  10.5 H 8.7-10.4  mg/dL


 


Triglycerides Level  Pending   


 


Cholesterol Level  Pending   


 


LDL Cholesterol  Pending   


 


HDL Cholesterol  Pending   


 


Thyroid Stimulating Hormone


(TSH) 


 Pending 


  











                               Current Medications








 Medications


  (Trade)  Dose


 Ordered  Sig/[place]


 Route  Start Time


 Stop Time Status Last Admin


 


 Aspirin  325 mg  ONCE  ONCE


 PO  3/6/25 08:30


 3/6/25 08:31 DC 3/6/25 08:29





 


 Nitroglycerin


  (Ntrostat


 Sublingual)  0.4 mg  ONCE  ONCE


 SL  3/6/25 08:30


 3/6/25 08:31 DC 3/6/25 08:30











                             [place] [id number]


                              Ph: [phone]





                               DIAGNOSTIC IMAGING


                      Diagnostic Imaging Report : [id number]


                                     Signed








PATIENT: MAYUR MARSH JACCT: [id number]        UNIT: [id number]


: 1985           LOC: ER                   ROOM / BED:  / 


AGE / SEX: 39 / M         ADM STATUS: REG ER        SERVICE DT: 25 0942





ORDERING PHYSICIAN: STEPH AGUILAR


PROCEDURE(s): CXR1 - CHEST XRAY 1 VIEW


REASON: cp


ORDER NUMBER(s): [id number], ACCESSION NUMBER(s): [id number]








EXAM: XY CHEST XRAY 1 VIEW





Indication: cp





Technique: Single frontal view of the chest was obtained





Comparison: XY CHEST PORTABLE on DOS: 25, CXRP on DOS: 10/5/22, CHEST PO

RTABLE on DOS: 22, CHEST PORTABLE on DOS: 22, CXRP on DOS: 22





FINDINGS: 





Lines and Tubes: None





Lungs: No focal consolidation.





Pleura: No effusion.





No pneumothorax. 





Cardiomediastinal contours: Unremarkable





Bones: No acute osseous abnormality.





IMPRESSION:





No acute cardiopulmonary disease.





Electronically Signed by: Lore Soria at 2025 10:13:16 AM











DICTATED BY: LORE SORIA MD


DICTATED DATE/TIME: 25 1013





SIGNED BY: LORE SORIA MD


SIGNED DATE/TIME: 25 1013





CC: 




















Patient alert.


Complaining of chest pain.


Vitals stable.


Answering questions.


Was given aspirin.


Was given nitro.


Continues to have chest pain.


Severe anxiety.


Was given Ativan.


EKG reviewed does not show any acute changes.


Echocardiogram.  


Stress test.  


Cardiology consultation.


Explained to the patient.  


Continue cardiac monitoring.


Time of 1ST Reevaluation:  08:50


Reevaluation 1ST:  Unchanged


Patient Education/Counseling:  Diagnosis, Treatment


Family Education/Counseling:  No Family Present





Departure 1


Departure


Time of Disposition:  08:55


Impression:  


   Primary Impression:  


   Chest pain of unknown etiology


Disposition:  09 ADMITTED AS INPATIENT


Admit to:  Med Surg


Condition:  Guarded





Critical Care Note


Critical Care Time?:  No





Stability


Stability form required:  No





Heart Score


Heart Score:  








Heart Score Response (Comments) Value


 


History Slightly Suspicious 0


 


EKG Normal 0


 


Age <45 0


 


Risk Factors                            1 or 2 risk factors 1


 


Troponin Normal limit 0


 


Total  1














I personally scribed for MARIELA ULNA MD ([email or web address]) on 3/6/25 at 08:35.  

Electronically submitted by Emily Reyes ([email or web address]).


I personally scribed for MARIELA LUNA MD ([email or web address]) on 3/6/25 at 10:25.  

Electronically submitted by Emily Reyes ([email or web address]).





MARIELA LUNA MD            Mar 6, 2025 08:35

## 2025-03-07 NOTE — DVHINCON2
Date Seen:  Mar 7, 2025


Referring Physician


MD Chantelle





Reason for Consultation


Chest pain





History of Present Illness


This is a 39-year-old male patient who presents to the emergency room with chief

complaint of chest pain.  The patient reports that the chest pain began at 

approximately 7:00 a.m. yesterday while he was getting ready for work.  He 

describes the pain as unprovoked, intermittent, dull in nature, and located 

under left clavicle area.  He denies any radiation or associated symptoms.  He 

came to the emergency room for further evaluation.  Initial twelve lead 

electrocardiogram reveals normal sinus rhythm with PVC.  Troponin levels have 

been negative X 2.  Significant past medical history includes previous alcohol 

abuse, previous tobacco use, anxiety, and obesity.  The patient reports he 

follows up with cardiologist  in the outpatient setting.  The patient 

reports he had a stress test approximately one year ago in which findings were 

normal to his knowledge.





Past Medical History


Past medical history reviewed. No other significant than mentioned above.





Past Surgical History


Denies





Family History:  


Patient reports no known family medical history.


Family History


Family history reviewed.


Social History


Patient reports history of heavy alcohol abuse, has been sober for seven years 


The patient has a five pack-year history, quit smoking approximately seven years

ago 


Denies any drug use





Allergies:  


Coded Allergies:  


     NO KNOWN ALLERGIES (Unverified , 4/11/22)


Home Meds


Active Scripts


Omeprazole Magnesium (Omeprazole) 20 Mg Tab, 20 MG PO DAILY for 14 Days, #14 TAB


   Prov:YOVANY GAMBLE MD         9/24/22


Home Meds


Denies taking any prescribed medications


Current Medications





                               Current Medications








 Medications


  (Trade)  Dose


 Ordered  Sig/Keara


 Route


 PRN Reason  Start Time


 Stop Time Status Last Admin


 


 Aspirin  81 mg  DAILY


 PO


   3/7/25 10:00


    3/7/25 08:40





 


 Atorvastatin


 Calcium


  (Lipitor)  40 mg  HS


 PO


   3/6/25 22:00


 3/7/25 15:30 DC 3/6/25 21:55





 


 Atorvastatin


 Calcium


  (Lipitor)  10 mg  HS


 PO


   3/7/25 22:00


     














Review of Systems


Constitutional:  No symptom reported


Ears, Nose, & Throat:  No symptom reported


Eyes:  No symptom reported


Neurological: No symptoms reported


Pulmonary/Respiratory: No symptoms reported


Cardiovascular:  Chest pain


Gastrointestinal:  No symptom reported


Genitourinary:  No symptom reported


Musculoskeletal:  No symptom reported


Skin:  No symptom reported


Psychiatric:  No symptom reported


Endocrine:  No symptom reported


Hematologic/Lymphatic:  No symptom reported





Vital Signs





                                   Vital Signs








  Date Time  Temp Pulse Resp B/P (MAP) Pulse Ox O2 Delivery O2 Flow Rate FiO2


 


3/7/25 16:47 98.0 57 16 113/77 (89) 97   





 98.0       


 


3/7/25 08:30      Room Air* 0 21








Physical Exam


General Appearance: Cooperative.  Obese


Pulmonary/Respiratory: Clear, bilateral breaths sounds. 


Cardiovascular/Chest: Regular rate and rhythm.  


Peripheral Pulses:  2+ Radial (R). 2+ Radial (L). 2+ Pedal (R). 2+ Pedal (L)


Abdominal Exam:  Normal bowel sounds.


Ankle Exam: Negative ankle edema


Lower extremities: Negative lower extremity edema


Neuro/Mental Status: A/OX4, coherent. 


Thoughts/Psych: Normal thought pattern. Appropriate mood and affect. Good 

judgment and insight. 


Appearance: No acute distress. 


Skin Exam:  Normal inspection.  Normal color. Warm and dry.





Labs/Diagnostic Data





                                      Labs








Test


 3/7/25


13:54 3/7/25


05:28 3/6/25


14:38 3/6/25


09:19 Range/Units


 


 


D-Dimer, Quantitative


 < 0.19 


 


 


 


 0.0-0.49  mg/L


FEU


 


White Blood Count


 


 4.8 #


 


 


 4.4-10.8


10^3/uL


 


Red Blood Count


 


 5.03 


 


 


 4.5-5.90


10^6/uL


 


Hemoglobin  15.9    13.5-17.5  g/dL


 


Hematocrit  46.4    41.0-53.0  %


 


Mean Corpuscular Volume  92.2    80.0-100.0  fL


 


Mean Corpuscular Hemoglobin  31.6    28.0-32.0  pg


 


Mean Corpuscular Hemoglobin


Concent 


 34.3 


 


 


 32.0-36.0  g/dL





 


Red Cell Distribution Width  13.2    11.8-14.3  %


 


Platelet Count


 


 171 


 


 


 140-450


10^3/uL


 


Mean Platelet Volume  7.9    6.9-10.8  fL


 


Neutrophils (%) (Auto)  66.2    37.0-80.0  %


 


Lymphocytes (%) (Auto)  23.3    10.0-50.0  %


 


Monocytes (%) (Auto)  6.9    0.0-12.0  %


 


Eosinophils (%) (Auto)  3.2    0.0-7.0  %


 


Basophils (%) (Auto)  0.4    0.0-2.0  %


 


Neutrophils # (Auto)


 


 3.2 


 


 


 1.6-8.6  10


^3/uL


 


Lymphocytes # (Auto)


 


 1.1 


 


 


 0.4-5.4  10


^3/uL


 


Monocytes # (Auto)  0.3    0-1.3  10 ^3/uL


 


Eosinophils # (Auto)  0.2    0-0.8  10 ^3/uL


 


Basophils # (Auto)  0    0-0.2  10 ^3/uL


 


Nucleated Red Blood Cells  0.3     %


 


Erythrocyte Sedimentation Rate  3    0-20  mm/hr


 


Sodium Level  141    136-145  mmol/L


 


Potassium Level  4.1    3.5-5.1  mmol/L


 


Chloride Level  108 H     mmol/L


 


Carbon Dioxide Level  28    20-31  mmol/L


 


Anion Gap  5    5-15  


 


Blood Urea Nitrogen  14    9-23  mg/dL


 


Creatinine


 


 1.04 


 


 


 0.700-1.30


mg/dL


 


Glomerular Filtration Rate


Calc 


 93 


 


 


 >90  mL/min





 


BUN/Creatinine Ratio  13.5    10.0-20.0  


 


Serum Glucose  106      mg/dL


 


Calcium Level  10.2    8.7-10.4  mg/dL


 


Magnesium Level  2.0    1.6-2.6  mg/dL


 


C-Reactive Protein High


Sensitivity 


 0.20 


 


 


 <1.0  mg/dL





 


Urine Opiates Screen   Neg   NEGATIVE  


 


Urine Fentanyl Screen   Neg   NEGATIVE  


 


Urine Barbiturates Screen   Neg   NEGATIVE  


 


Urine Phencyclidine Screen   Neg   NEGATIVE  


 


Urine Amphetamines Screen   Neg   NEGATIVE  


 


Urine Benzodiazepines Screen   Neg   NEGATIVE  


 


Urine Cocaine Screen   Neg   NEGATIVE  


 


Urine Cannabinoids Screen   Neg   NEGATIVE  


 


Troponin I High Sensitivity    3 L </=54  ng/L


 


Test


 3/6/25


08:19 


 


 


 Range/Units


 


 


Hemoglobin A1c 5.1     <5.7  % A1C


 


Triglycerides Level 78     < 150  mg/dL


 


Cholesterol Level 188     < 200  mg/dL


 


LDL Cholesterol 125 H    < 100  mg/dL


 


HDL Cholesterol 52     40-59  mg/dL


 


Thyroid Stimulating Hormone


(TSH) 0.68 


 


 


 


 0.55-4.78


uIU/mL











Assessment


Noncardiac chest pain 


Hyperlipidemia 


History of tobacco use 


Anxiety 


Morbid obesity


Plan/Recommendation


We will continue with the following plan/recommendations (Dr. Ivey):





Plan reviewed and discussed with .  Transthoracic echocardiogram 

reveals an EF 55-60% without any valve abnormalities.  Given the patient's 

clinical presentation, negative troponin level, and unremarkable twelve lead 

electrocardiogram, doubt ACS.  We will recommend supportive therapy.  The 

patient should follow up with his primary cardiologist Dr. Viveros in the 

outpatient setting.  Consider outpatient stress test if deemed necessary per 

primary cardiologist discretion.  There is no further inpatient cardiac workup 

indicated at this time.  Thank you for allowing us to care for this patient. 

Please call with any questions or concerns.  Critical care time spent:42 minutes





This medical document was created using an electronic medical record system with

voice recognition software and computerized dictation system.  Although this 

document has been carefully reviewed, there might still be some phonetic and 

typographical errors.  Occasional wrong-word or ``sound-alike substitutions 

may have occurred due to the inherent limitations of voice recognition software.

 These areas are purely typographical due to imperfections of the software 

programs and do not reflect any compromise in the patient's medical care.  

Please read the chart carefully and recognize, using context, where these 

substitutions have occurred.


Plan discussed with:  Patient


NYHA








Physical activity limitations: NA











Date of Service:  Mar 7, 2025


Billing Provider:  MARVEL IVEY MD


Cardiology Common Codes:  99223-INITIAL  INP/OBS CARE (High)


Cardiology Consultation Codes:  39547-WSANOCTBI CONSULT <45MIN











KYLE KELLY              Mar 7, 2025 18:16

## 2025-03-07 NOTE — DVHSR
--------------- APPROVED REPORT --------------





EXAM: Two-dimensional and M-mode echocardiogram with Doppler and color Doppler.



Blood Pressure: 131/85 mmHg



INDICATION

Chest Pain 



RISK FACTORS

Height: 6' 1", Weight: 240



DIMENSIONS 

LVDd5.7 (3.8-5.7cm)LA (2D)4.3 (1.9-4.0cm)Aortic Root3.9 (2.0-3.7cm)

LVDs4.0 (2.5-4.0cm)LA (MM) (1.9-4.0cm)Aortic Cusp Exc2.1 (1.5-2.0cm)

EF (%) 57.0 (55-70%)Rt. Atrium4.4 (1.9-4.0cm)Asc. Aorta cm

IVSd1.2 (0.7-1.1cm)RV (D) (1.8-2.4cm)

PWd1.0 (0.7-1.1cm)



Mitral Valve

MitralMitral Stenosis

E wave0.80m/sMV Mean GR.mmHg

A wave0.70m/sMV Peak GR.mmHg

E/A ratio1.12D MVAcm2



Aortic Valve

Aortic ValveAortic Stenosis

V10.90m/Paul Mean GR.3mmHg

V21.10m/Paul Peak GR.5mmHg

LVOT Diameter2.7 (1.8-2.4cm)Doppler AVA4.68cm2



Pulmonic Valve

V20.60m/s



Conclusion

lvef 55-60% by visual estimate

normal rv function

normal atria

no severe valve abnormalities noted

pericardial effusion posterior vs fat pad noted

## 2025-03-07 NOTE — DVHPN2
Subjective


Feels better but still some chest discomfort and not feeling at ease


Reviewed:  Care Plan, H&P, Labs, Medications, Previous Orders, Radiology


Changes from previous H/P or p:  No Changes


Cardiovascular:  Chest Pain


Respiratory:  Shortness of breath


Musculoskeletal:  other (Numbness and tingling bilateral upper and lower 

extremities)





Objective


Vitals





Vital Signs








  Date Time  Temp Pulse Resp B/P (MAP) Pulse Ox O2 Delivery O2 Flow Rate FiO2


 


3/7/25 13:00 97.5 60 17 104/58 (73) 97   





 97.5       


 


3/6/25 08:51      Room Air* 0 21








General Appearance:  Alert, Oriented X3, Cooperative, No acute distress


HEENT:  Atraumatic


Lungs:  Clear to auscultation


Cardiovascular:  Regular rate, Normal S1, Normal S2


Extremities:  No edema


Medications





                               Current Medications








 Medications  Dose


 Ordered  Sig/Keara


 Route  Start Time


 Stop Time Status Last Admin


Dose Admin


 


 Aspirin  81 mg  DAILY


 PO  3/7/25 10:00


    3/7/25 08:40


81 MG


 


 Acetaminophen  650 mg  Q6HP  PRN


 PO  3/6/25 11:30


     





 


 Nitroglycerin  0.4 mg  Q5MINP  PRN


 SL  3/6/25 11:30


     





 


 Morphine Sulfate  2 mg  Q30M  PRN


 IV  3/6/25 11:30


     





 


 Atorvastatin


 Calcium  10 mg  HS


 PO  3/7/25 22:00


   UNV  














Laboratory Results


Laboratory Tests


3/7/25 05:28











Chemistry








Test


 3/7/25


05:28


 


Calcium Level


 10.2 mg/dL


(8.7-10.4)


 


Magnesium Level


 2.0 mg/dL


(1.6-2.6)








Coagulation








Test


 3/7/25


13:54


 


D-Dimer, Quantitative


 < 0.19 mg/L


FEU (0.0-0.49)











Assessment/Plan


Assessment/Plan


Chest pain and tightness of unclear etiology


Hypercholesterolemia 


Possible anxiety


Obesity 





Plan:


Check D-dimer.  Cardiology consultation.  If D-dimer elevated we will obtain 

chest CT.  Further plan per orders


Plan discussed with:  Patient


My Orders





                           Orders - DERICK ALVARADO MD








Procedure Category Date Status





  Time 


 


* Cardiology Consult CONS 3/7/25 Transmitted





  15:28 


 


Atorvastatin (Lipitor) PHA 3/7/25 Logged





  22:00 


 


Erythrocyte LAB 3/7/25 Transmitted





Sedimentation Rate  15:29 


 


C-Reactive Protein LAB 3/7/25 Transmitted





  15:29 











Date of Service:  Mar 7, 2025


Billing Provider:  DERICK ALVARADO MD


Common Visit Codes:  40446-[id number] INP/OBS CARE(HIGH)











DERICK ALVARADO MD               Mar 7, 2025 15:36

## 2025-03-08 NOTE — DVHDS2
Discharge Summary


Date of Admission


Mar 6, 2025 at 11:23





Date of Discharge:


Mar 8, 2025





Admitting Diagnosis


Chest Pain





Labs/Diagnostic Data:





                               Laboratory Results








Test


 3/7/25


13:54 3/7/25


05:28 3/6/25


14:38 3/6/25


09:19


 


D-Dimer, Quantitative


 < 0.19 mg/L


FEU (0.0-0.49) 


 


 





 


White Blood Count


 


 4.8 10^3/uL


(4.4-10.8) 


 





 


Red Blood Count


 


 5.03 10^6/uL


(4.5-5.90) 


 





 


Hemoglobin


 


 15.9 g/dL


(13.5-17.5) 


 





 


Hematocrit


 


 46.4 %


(41.0-53.0) 


 





 


Mean Corpuscular Volume


 


 92.2 fL


(80.0-100.0) 


 





 


Mean Corpuscular Hemoglobin


 


 31.6 pg


(28.0-32.0) 


 





 


Mean Corpuscular Hemoglobin


Concent 


 34.3 g/dL


(32.0-36.0) 


 





 


Red Cell Distribution Width


 


 13.2 %


(11.8-14.3) 


 





 


Platelet Count


 


 171 10^3/uL


(140-450) 


 





 


Mean Platelet Volume


 


 7.9 fL


(6.9-10.8) 


 





 


Neutrophils (%) (Auto)


 


 66.2 %


(37.0-80.0) 


 





 


Lymphocytes (%) (Auto)


 


 23.3 %


(10.0-50.0) 


 





 


Monocytes (%) (Auto)


 


 6.9 %


(0.0-12.0) 


 





 


Eosinophils (%) (Auto)


 


 3.2 %


(0.0-7.0) 


 





 


Basophils (%) (Auto)


 


 0.4 %


(0.0-2.0) 


 





 


Neutrophils # (Auto)


 


 3.2 10 ^3/uL


(1.6-8.6) 


 





 


Lymphocytes # (Auto)


 


 1.1 10 ^3/uL


(0.4-5.4) 


 





 


Monocytes # (Auto)


 


 0.3 10 ^3/uL


(0-1.3) 


 





 


Eosinophils # (Auto)


 


 0.2 10 ^3/uL


(0-0.8) 


 





 


Basophils # (Auto)


 


 0 10 ^3/uL


(0-0.2) 


 





 


Nucleated Red Blood Cells  0.3 %   


 


Erythrocyte Sedimentation Rate  3 mm/hr (0-20)   


 


Sodium Level


 


 141 mmol/L


(136-145) 


 





 


Potassium Level


 


 4.1 mmol/L


(3.5-5.1) 


 





 


Chloride Level


 


 108 mmol/L


() 


 





 


Carbon Dioxide Level


 


 28 mmol/L


(20-31) 


 





 


Anion Gap  5 (5-15)   


 


Blood Urea Nitrogen


 


 14 mg/dL


(9-23) 


 





 


Creatinine


 


 1.04 mg/dL


(0.700-1.30) 


 





 


Glomerular Filtration Rate


Calc 


 93 mL/min


(>90) 


 





 


BUN/Creatinine Ratio


 


 13.5


(10.0-20.0) 


 





 


Serum Glucose


 


 106 mg/dL


() 


 





 


Calcium Level


 


 10.2 mg/dL


(8.7-10.4) 


 





 


Magnesium Level


 


 2.0 mg/dL


(1.6-2.6) 


 





 


C-Reactive Protein High


Sensitivity 


 0.20 mg/dL


(<1.0) 


 





 


Urine Opiates Screen   Neg (NEGATIVE)  


 


Urine Fentanyl Screen   Neg (NEGATIVE)  


 


Urine Barbiturates Screen   Neg (NEGATIVE)  


 


Urine Phencyclidine Screen   Neg (NEGATIVE)  


 


Urine Amphetamines Screen   Neg (NEGATIVE)  


 


Urine Benzodiazepines Screen   Neg (NEGATIVE)  


 


Urine Cocaine Screen   Neg (NEGATIVE)  


 


Urine Cannabinoids Screen   Neg (NEGATIVE)  


 


Troponin I High Sensitivity    3 ng/L (</=54) 


 


Test


 3/6/25


08:19 


 


 





 


Hemoglobin A1c


 5.1 % A1C


(<5.7) 


 


 





 


Triglycerides Level


 78 mg/dL (<


150) 


 


 





 


Cholesterol Level


 188 mg/dL (<


200) 


 


 





 


LDL Cholesterol


 125 mg/dL (<


100) 


 


 





 


HDL Cholesterol


 52 mg/dL


(40-59) 


 


 





 


Thyroid Stimulating Hormone


(TSH) 0.68 uIU/mL


(0.55-4.78) 


 


 








                             Other Laboratory Tests


3/7/25 05:28











Brief Hx & Hospital Course:


39-year-old gentleman admitted to the hospital from the emergency room with 

chest pain and some tightness.  Cardiac workup was negative with negative 

troponin and echocardiogram.  EF 55-60%.  Cardiology consultation obtained and 

deemed the pain is not cardiac and recommended to follow up as outpatient with 

patient on cardiologist and have a stress test if deemed necessary as 

outpatient.  D-dimer was negative.  Chest x-ray negative.  Patient does have 

some anxiety as well and he had maybe some panic attack with some tingling in 

upper and lower extremities during the episode





Consults/Reason for consult


Cardiology.  No interventions





Condition at Discharge:


Good





Final Diagnosis/Problems List





Chest wall pain


Anxiety


Hypercholesterolemia/mild 


Obesity





Discharge Disposition:


Home





Discharge Instruct/Medications


Diet:  Cardiac 2g Na,low cholest (2 gm sodium, low cholesterol)


Activity:  No Restrictions, As Tolerated


Follow Up/Referral:  


PCP within one week.  


Cardiology within one week


Medications:  


Baby aspirin 81 mg until seen by the Cardiology as outpatient.


Discharge Statement:


"Patient was advised to return to the ER or call 911 if any headaches, 

dizziness, shortness of breath, chest pain, abdominal pain, bleeding, fevers, or

worsening of medical condition.





Patient was counseled about treatment plan, medications, possible side effects, 

patientverbalized understanding. All questions were answered to the best of my 

ability.





This discharge took greater then 30 minutes in planning, reviewing 

documentation, counseling the patient, and discussing with other team members."





ASSESSMENT








Date of Service:  Mar 8, 2025


Billing Provider:  DERICK ALVARADO MD


Common Visit Codes:  24270-TKX/OBS DISCH DAY <30MIN











DERICK ALVARADO MD               Mar 8, 2025 10:42

## 2025-03-08 NOTE — DVH
CHEST RADIOGRAPH



Indication: fu



Technique: Frontal and lateral view of the chest was obtained



Comparison: CHEST TWO VIEWS ROUTINE on DOS: 9/24/22



FINDINGS: 



Lines and Tubes: None



Lungs: Clear



Pleura: No effusion.



No pneumothorax. 



Cardiomediastinal contours: Unremarkable



Bones: Unremarkable



IMPRESSION: 



No evidence of acute disease.



Electronically Signed by: Thompson Harper at 03/08/2025 09:14:12 AM